# Patient Record
Sex: FEMALE | Race: WHITE | Employment: UNEMPLOYED | URBAN - METROPOLITAN AREA
[De-identification: names, ages, dates, MRNs, and addresses within clinical notes are randomized per-mention and may not be internally consistent; named-entity substitution may affect disease eponyms.]

---
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
## 2020-10-05 ENCOUNTER — HOSPITAL ENCOUNTER (EMERGENCY)
Age: 20
Discharge: HOME | End: 2020-10-06
Attending: EMERGENCY MEDICINE
Payer: MEDICAID

## 2020-10-05 VITALS
DIASTOLIC BLOOD PRESSURE: 97 MMHG | BODY MASS INDEX: 57.52 KG/M2 | HEIGHT: 60 IN | HEART RATE: 108 BPM | TEMPERATURE: 98.9 F | WEIGHT: 293 LBS | RESPIRATION RATE: 18 BRPM | OXYGEN SATURATION: 100 % | SYSTOLIC BLOOD PRESSURE: 149 MMHG

## 2020-10-05 DIAGNOSIS — N92.6 IRREGULAR MENSTRUAL CYCLE: Primary | ICD-10-CM

## 2020-10-05 DIAGNOSIS — R35.0 URINARY FREQUENCY: ICD-10-CM

## 2020-10-05 DIAGNOSIS — N30.00 ACUTE CYSTITIS WITHOUT HEMATURIA: ICD-10-CM

## 2020-10-05 LAB
APPEARANCE UR: ABNORMAL
BACTERIA URNS QL MICRO: ABNORMAL /HPF
BILIRUB UR QL: NEGATIVE
COLOR UR: ABNORMAL
EPITH CASTS URNS QL MICRO: ABNORMAL /LPF
GLUCOSE UR STRIP.AUTO-MCNC: NEGATIVE MG/DL
HCG UR QL: NEGATIVE
HGB UR QL STRIP: NEGATIVE
HYALINE CASTS URNS QL MICRO: ABNORMAL /LPF (ref 0–5)
KETONES UR QL STRIP.AUTO: NEGATIVE MG/DL
LEUKOCYTE ESTERASE UR QL STRIP.AUTO: ABNORMAL
NITRITE UR QL STRIP.AUTO: NEGATIVE
PH UR STRIP: 6 [PH] (ref 5–8)
PROT UR STRIP-MCNC: NEGATIVE MG/DL
RBC #/AREA URNS HPF: ABNORMAL /HPF (ref 0–5)
SP GR UR REFRACTOMETRY: 1.02 (ref 1–1.03)
UA: UC IF INDICATED,UAUC: ABNORMAL
UROBILINOGEN UR QL STRIP.AUTO: 1 EU/DL (ref 0.2–1)
WBC URNS QL MICRO: ABNORMAL /HPF (ref 0–4)

## 2020-10-05 PROCEDURE — 81001 URINALYSIS AUTO W/SCOPE: CPT

## 2020-10-05 PROCEDURE — 81025 URINE PREGNANCY TEST: CPT

## 2020-10-05 PROCEDURE — 99283 EMERGENCY DEPT VISIT LOW MDM: CPT

## 2020-10-05 RX ORDER — CEPHALEXIN 500 MG/1
500 CAPSULE ORAL 2 TIMES DAILY
Qty: 14 CAP | Refills: 0 | Status: SHIPPED | OUTPATIENT
Start: 2020-10-05 | End: 2020-10-12

## 2020-10-05 RX ORDER — CEPHALEXIN 250 MG/1
500 CAPSULE ORAL
Status: COMPLETED | OUTPATIENT
Start: 2020-10-05 | End: 2020-10-06

## 2020-10-06 PROCEDURE — 74011250637 HC RX REV CODE- 250/637: Performed by: EMERGENCY MEDICINE

## 2020-10-06 RX ADMIN — CEPHALEXIN 500 MG: 250 CAPSULE ORAL at 00:09

## 2020-10-06 NOTE — ED PROVIDER NOTES
EMERGENCY DEPARTMENT HISTORY AND PHYSICAL EXAM      Date: 10/5/2020  Patient Name: Contreras Senior    History of Presenting Illness     Chief Complaint   Patient presents with    Missed Menses     Pt reports her menstrual cycle is 10 days late, reprots positive pregnancy test at home, followed by a negative pregnancy test. Patient reports she missed her birth control pill last Monday. Reports lower abdominal pain/ lower back pain at times. History Provided By: Patient    HPI: Contreras Senior, 21 y.o. female  With previous medical history of heavy menstrual cycles presenting today with a missed period. The patient states that she has been taking birth control pills because of prolonged menstrual cycles that will sometimes last for around 3 weeks. She notes that she has been taking her birth control pills as prescribed and has not had a cycle this month and is approximately 10 days late. She states that she initially took a pregnancy test that was negative, and then she took another UPT that was negative. She went to urgent care 2 days ago and had a blood test that showed no evidence of pregnancy. The patient notes that she still has not had a menstrual cycle has not had any abdominal cramping. She does state that she is been having increased urinary frequency, dysuria, and low back pain. No fevers or chills. No nausea or vomiting. No other complaints at this time. There are no other complaints, changes, or physical findings at this time. PCP: Andrea Connelly NP    No current facility-administered medications on file prior to encounter. Current Outpatient Medications on File Prior to Encounter   Medication Sig Dispense Refill    zaleplon (SONATA) 10 mg capsule Take 1 Cap by mouth nightly as needed for Sleep. Max Daily Amount: 10 mg. Indications: Difficulty Falling Asleep 30 Cap 5    buPROPion HCl 450 mg Tb24 Take 450 mg by mouth every morning.  30 Tab 5    ondansetron hcl (ZOFRAN) 4 mg tablet Take 4 mg by mouth every eight (8) hours as needed for Nausea.  albuterol (PROVENTIL HFA, VENTOLIN HFA, PROAIR HFA) 90 mcg/actuation inhaler Take 2 Puffs by inhalation every six (6) hours as needed for Wheezing. 1 Inhaler 0    drospirenone-ethinyl estradiol (GENIE, 28,) 3-0.02 mg tab Take 1 Tab by mouth daily. Past History     Past Medical History:  Past Medical History:   Diagnosis Date    Anxiety     Asthma     HAS OUTGROWN IT    Depression     GERD (gastroesophageal reflux disease)     Psychiatric disorder     depression and anxiety    Symptomatic cholelithiasis 11/27/2018       Past Surgical History:  Past Surgical History:   Procedure Laterality Date    HX LAP CHOLECYSTECTOMY  12/21/2028    Laparoscopic cholecystectomy by Dr. Daisha Gilliland.  HX TONSILLECTOMY      HX TYMPANOSTOMY         Family History:  Family History   Problem Relation Age of Onset    Crohn's Disease Mother     Lupus Mother     Heart Disease Father     Hypertension Father     Bipolar Disorder Sister     Other Sister         ADHD       Social History:  Social History     Tobacco Use    Smoking status: Never Smoker    Smokeless tobacco: Never Used   Substance Use Topics    Alcohol use: No    Drug use: Never       Allergies: Allergies   Allergen Reactions    Poison Ivy Relief [Benzocaine] Anaphylaxis    Poison Oak Extract Hives         Review of Systems   Constitutional: No  fever  Skin: No  rash  HEENT: No  nasal congestion  Resp: No cough  CV: No chest pain  GI: No vomiting  : + dysuria  MSK: No joint pain  Neuro: No numbness  Psych: No suicidal      Physical Exam     Patient Vitals for the past 12 hrs:   Temp Pulse Resp BP SpO2   10/05/20 2310 98.9 °F (37.2 °C) (!) 108 18 (!) 149/97 100 %       General: alert, No acute distress  Eyes: EOMI, normal conjunctiva  ENT: moist mucous membranes. Neck: Active, full ROM of neck. Skin: No rashes. no jaundice              Lungs: Equal chest expansion. no respiratory distress. Heart: regular rate     no peripheral edema    Abd:  non distended soft, no abdominal pain to palpation. Back: Full ROM  MSK: Full, active ROM in all 4 extremities. Neuro: alert  Person, Place, Time and Situation; normal speech;   Psych: Cooperative with exam; Appropriate mood and affect             Diagnostic Study Results     Labs -     Recent Results (from the past 12 hour(s))   URINALYSIS W/ REFLEX CULTURE    Collection Time: 10/05/20 11:20 PM    Specimen: Urine   Result Value Ref Range    Color YELLOW/STRAW      Appearance CLOUDY (A) CLEAR      Specific gravity 1.025 1.003 - 1.030      pH (UA) 6.0 5.0 - 8.0      Protein Negative NEG mg/dL    Glucose Negative NEG mg/dL    Ketone Negative NEG mg/dL    Bilirubin Negative NEG      Blood Negative NEG      Urobilinogen 1.0 0.2 - 1.0 EU/dL    Nitrites Negative NEG      Leukocyte Esterase SMALL (A) NEG      WBC 5-10 0 - 4 /hpf    RBC 0-5 0 - 5 /hpf    Epithelial cells MODERATE (A) FEW /lpf    Bacteria 2+ (A) NEG /hpf    UA:UC IF INDICATED CULTURE NOT INDICATED BY UA RESULT CNI      Hyaline cast 2-5 0 - 5 /lpf   HCG URINE, QL. - POC    Collection Time: 10/05/20 11:28 PM   Result Value Ref Range    Pregnancy test,urine (POC) Negative NEG         Radiologic Studies -   No orders to display     CT Results  (Last 48 hours)    None        CXR Results  (Last 48 hours)    None          Medical Decision Making   I am the first provider for this patient. I reviewed the vital signs, available nursing notes, past medical history, past surgical history, family history and social history. Vital Signs-Reviewed the patient's vital signs.   Patient Vitals for the past 12 hrs:   Temp Pulse Resp BP SpO2   10/05/20 2310 98.9 °F (37.2 °C) (!) 108 18 (!) 149/97 100 %       Pulse Oximetry Analysis - 100% on room air    Provider Notes (Medical Decision Making):     Differential Diagnosis: Pregnancy, urinary tract infection, irregular menstrual cycle, menorrhagia    Initial Plan: Will obtain urinalysis sample and UPT. The patient has a benign abdominal exam.  She was mildly tachycardic upon arrival, but no significant findings on her exam.    ED Course:   Initial assessment performed. The patients presenting problems have been discussed, and they are in agreement with the care plan formulated and outlined with them. I have encouraged them to ask questions as they arise throughout their visit. ED Course as of Oct 06 0157   Mon Oct 05, 2020   2338 On my interpretation of the patient's laboratory studies urinalysis shows 2+ bacteria, leukocyte esterase and white blood cells, negative pregnancy test.    [NW]   2341 Patient presenting today with irregular menstrual cycle in the setting of taking birth control. She had a quantitative blood hCG that was negative done at urgent care a couple of days ago, and has had multiple urine pregnancy test that are negative including today. We discussed that she could just be having irregular cycle even despite being on the birth control pills. I will treat her urinary tract infection as she is having some symptoms of dysuria and urinary frequency. Ultimately patient is discharged to follow-up as an outpatient with her primary OB/GYN.    [NW]      ED Course User Index  [NW] Meek Brock MD       I, Christy Hutton MD, am the attending of record for this patient encounter. Dispo: Discharged. The patient has been re-evaluated and is ready for discharge. Reviewed available results with patient. Counseled patient on diagnosis and care plan. Patient has expressed understanding, and all questions have been answered. Patient agrees with plan and agrees to follow up as recommended, or to return to the ED if their symptoms worsen. Discharge instructions have been provided and explained to the patient, along with reasons to return to the ED. PLAN:  Discharge Medication List as of 10/6/2020 12:12 AM        2.      Follow-up Information     Follow up With Specialties Details Why [facility]  Schedule an appointment as soon as possible for a visit   [address] [address]        3. Return to ED if worse       Diagnosis     Clinical Impression:   1. Irregular menstrual cycle    2. Urinary frequency    3. Acute cystitis without hematuria        Attestations:    Aquiles Davis MD    Please note that this dictation was completed with [employer], the computer voice recognition software. Quite often unanticipated grammatical, syntax, homophones, and other interpretive errors are inadvertently transcribed by the computer software. Please disregard these errors. Please excuse any errors that have escaped final proofreading. Thank you.

## 2022-01-11 ENCOUNTER — HOSPITAL ENCOUNTER (EMERGENCY)
Age: 22
Discharge: HOME | End: 2022-01-11
Attending: EMERGENCY MEDICINE
Payer: MEDICAID

## 2022-01-11 VITALS
RESPIRATION RATE: 18 BRPM | HEART RATE: 113 BPM | BODY MASS INDEX: 53.92 KG/M2 | TEMPERATURE: 98.6 F | OXYGEN SATURATION: 99 % | HEIGHT: 62 IN | WEIGHT: 293 LBS

## 2022-01-11 DIAGNOSIS — R51.9 NONINTRACTABLE HEADACHE, UNSPECIFIED CHRONICITY PATTERN, UNSPECIFIED HEADACHE TYPE: ICD-10-CM

## 2022-01-11 DIAGNOSIS — Z20.822 SUSPECTED COVID-19 VIRUS INFECTION: Primary | ICD-10-CM

## 2022-01-11 LAB
SARS-COV-2, COV2: NORMAL
SARS-COV-2, XPLCVT: DETECTED
SOURCE, COVRS: ABNORMAL

## 2022-01-11 PROCEDURE — 99282 EMERGENCY DEPT VISIT SF MDM: CPT

## 2022-01-11 PROCEDURE — 74011250637 HC RX REV CODE- 250/637: Performed by: EMERGENCY MEDICINE

## 2022-01-11 PROCEDURE — U0005 INFEC AGEN DETEC AMPLI PROBE: HCPCS

## 2022-01-11 RX ORDER — IBUPROFEN 600 MG/1
600 TABLET ORAL
Status: COMPLETED | OUTPATIENT
Start: 2022-01-11 | End: 2022-01-11

## 2022-01-11 RX ADMIN — IBUPROFEN 600 MG: 600 TABLET ORAL at 04:14

## 2022-01-11 NOTE — Clinical Note
[facility] EMERGENCY DEPT  [address]  [address] [place] [id number]  [phone]    Work/School Note    Date: 1/11/2022     To Whom It May concern: Janet Jefferson was evaluated by the following provider(s):  Attending Provider: Ankita Day MD.   COVID19 virus is suspected. Per the CDC guidelines we recommend home isolation until the following conditions are all met:    1. At least five days have passed since symptoms first appeared and/or had a close exposure,   2. After home isolation for five days, wearing a mask around others for the next five days,  3. At least 24 have passed since last fever without the use of fever-reducing medications and  4.  Symptoms (eg cough, shortness of breath) have improved      Sincerely,          Felecia Porter MD

## 2022-01-11 NOTE — ED PROVIDER NOTES
EMERGENCY DEPARTMENT HISTORY AND PHYSICAL EXAM      Date: 1/11/2022  Patient Name: Carmel Whittaker    History of Presenting Illness     Chief Complaint   Patient presents with    Headache     onset with headache, concerned about COVID       History Provided By: Patient    HPI: Carmel Whittaker, 24 y.o. female with no significant past medical history presents to the ED with chief complaint of headache for the past 2 days. Patient had a temperature of 101.1 before coming to the ED. She has taken Advil, Tylenol, Midol with mild relief. She has not had any COVID vaccines. She denies any chest pain or shortness of breath. Denies any nausea or vomiting. 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 States that she presents to the ED to be evaluated for possible COVID because  \"I just want to be safe. \"  Denies chest pain, cough, shortness of breath, loss of taste or smell. PCP: Jane Marin NP    No current facility-administered medications on file prior to encounter. Current Outpatient Medications on File Prior to Encounter   Medication Sig Dispense Refill    zaleplon (SONATA) 10 mg capsule Take 1 Cap by mouth nightly as needed for Sleep. Max Daily Amount: 10 mg. Indications: Difficulty Falling Asleep 30 Cap 5    buPROPion HCl 450 mg Tb24 Take 450 mg by mouth every morning. 30 Tab 5    ondansetron hcl (ZOFRAN) 4 mg tablet Take 4 mg by mouth every eight (8) hours as needed for Nausea.  albuterol (PROVENTIL HFA, VENTOLIN HFA, PROAIR HFA) 90 mcg/actuation inhaler Take 2 Puffs by inhalation every six (6) hours as needed for Wheezing. 1 Inhaler 0    drospirenone-ethinyl estradiol (GENIE, 28,) 3-0.02 mg tab Take 1 Tab by mouth daily.          Past History     Past Medical History:  Past Medical History:   Diagnosis Date    Anxiety     Asthma     HAS OUTGROWN IT    Depression     GERD (gastroesophageal reflux disease)     Psychiatric disorder     depression and anxiety    Symptomatic cholelithiasis 11/27/2018       Past Surgical History:  Past Surgical History:   Procedure Laterality Date    HX LAP CHOLECYSTECTOMY  12/21/2028    Laparoscopic cholecystectomy by Dr. Horace Sweet.  HX TONSILLECTOMY      HX TYMPANOSTOMY         Family History:  Family History   Problem Relation Age of Onset    Crohn's Disease Mother     Lupus Mother     Heart Disease Father     Hypertension Father     Bipolar Disorder Sister     Other Sister         ADHD       Social History:  Social History     Tobacco Use    Smoking status: Never Smoker    Smokeless tobacco: Never Used   Substance Use Topics    Alcohol use: No    Drug use: Never       Allergies: Allergies   Allergen Reactions    Poison Ivy Relief [Benzocaine] Anaphylaxis    Poison Oak Extract Hives         Review of Systems   Review of Systems   Constitutional: Positive for fever. Negative for chills. HENT: Negative for congestion, ear pain, rhinorrhea and sore throat. Eyes: Negative for visual disturbance. Respiratory: Negative for cough, chest tightness and wheezing. Cardiovascular: Negative for chest pain and palpitations. Gastrointestinal: Negative for abdominal pain, diarrhea, nausea and vomiting. Genitourinary: Negative for dysuria, flank pain and hematuria. Musculoskeletal: Negative for back pain, myalgias and neck pain. Neurological: Positive for headaches. Negative for syncope and light-headedness. Psychiatric/Behavioral: The patient is not nervous/anxious.           Physical Exam    General appearance -overweight, well appearing, and in no distress  Eyes - pupils equal and reactive, extraocular eye movements intact  ENT - mucous membranes moist, pharynx normal without lesions  Neck - supple, no significant adenopathy; non-tender to palpation  Chest - clear to auscultation, no wheezes, rales or rhonchi; non-tender to palpation  Heart - normal rate and regular rhythm, S1 and S2 normal, no murmurs noted  Abdomen - soft, nontender, nondistended, no masses or organomegaly  Musculoskeletal - no joint tenderness, deformity or swelling; normal ROM  Extremities - peripheral pulses normal, no pedal edema  Skin - normal coloration and turgor, no rashes  Neurological - alert, oriented x3, normal speech, no focal findings or movement disorder noted    Diagnostic Study Results     Labs -   No results found for this or any previous visit (from the past 12 hour(s)). Radiologic Studies -   No orders to display     CT Results  (Last 48 hours)    None        CXR Results  (Last 48 hours)    None            Medical Decision Making   I am the first provider for this patient. I reviewed the vital signs, available nursing notes, past medical history, past surgical history, family history and social history. Vital Signs-Reviewed the patient's vital signs. Records Reviewed: Nursing Notes and Old Medical Records    Provider Notes (Medical Decision Making):   Differential diagnosis: Viral syndrome, COVID, tension, cluster, migraine headache    ED Course:   Initial assessment performed. The patients presenting problems have been discussed, and they are in agreement with the care plan formulated and outlined with them. I have encouraged them to ask questions as they arise throughout their visit. Disposition:  Discharge home    PLAN:  1. Discharge Medication List as of 1/11/2022  3:56 AM        2. Follow-up Information     Follow up With Specialties Details Why Contact Info    Mark Kaur NP Nurse Practitioner Schedule an appointment as soon as possible for a visit   [address] [facility]  [phone]      [facility] EMERGENCY DEPT Emergency Medicine  If symptoms worsen [address] [place] [address] Drive  [address] [facility]  [phone]        Return to ED if worse     Diagnosis     Clinical Impression:   1. Suspected COVID-19 virus infection    2.  Nonintractable headache, unspecified chronicity pattern, unspecified headache type

## 2022-01-12 NOTE — PROGRESS NOTES
RE COVID: Patient has seen the positive result in My Chart. Called patient and left \[facility]\"" complaint  for return call to discuss results. Letter not sent since patient has already seen the results.

## 2022-03-19 PROBLEM — E66.01 OBESITY, MORBID (HCC): Status: ACTIVE | Noted: 2018-06-29

## 2022-03-19 PROBLEM — K80.20 SYMPTOMATIC CHOLELITHIASIS: Status: ACTIVE | Noted: 2018-11-27

## 2024-01-03 ENCOUNTER — HOSPITAL ENCOUNTER (OUTPATIENT)
Facility: HOSPITAL | Age: 24
Discharge: HOME | End: 2024-01-06
Payer: MEDICAID

## 2024-01-03 DIAGNOSIS — M51.36 DDD (DEGENERATIVE DISC DISEASE), LUMBAR: ICD-10-CM

## 2024-01-03 DIAGNOSIS — M54.16 LEFT LUMBAR RADICULITIS: ICD-10-CM

## 2024-01-03 DIAGNOSIS — M54.50 LUMBAR SPINE PAIN: ICD-10-CM

## 2024-01-03 PROCEDURE — 72148 MRI LUMBAR SPINE W/O DYE: CPT

## 2024-08-28 NOTE — DIABETES MGMT
[facility] - Weight Management Note       DATE: 19    REFERRING PHYSICIAN: Tory Murray   NAME: Oscar Galeas : 2000 AGE: 23 y.o. GENDER: female  Initial Individual Weight Management Visit    Pt seen individually today for help with weight loss. Pt with hx of newly dx sleep apnea (working on communication with insurance to obtain CPAP), depression, and anxiety. Pt sees psychiatrist, was seeing talk therapist however discontinue this approximately 2 years ago as \" I was stable to be released\"  . Pt was hospitalized ~ 5 years ago (pt shared that this was related to suicidal ideation). Pt's shared that her main contributors to weight gain are emotional/stress eating. Pt shared that \"I have always been bigger\", reports weight of 250-280 lbs in last 4 years with current weight being the heaviest she has been. Pt shared that she is not focused on a specific number/ goal weight but that she wants to \"be healthy\" and be able to go to the MD office and not be told she needs to lose weight/is obese. Pt does some walking , reports walking ~ 1 mile with [employer] every other day. Pt did share she gets very self conscious when she walks outside, feels like people are staring at her. Pt notes hx of stomach cramps from gallbladder removal and consumption of spicy, greasy foods as well as during her cycle. Reports pain of 6/7 of 10 scale. Pt has poor sleep habits, often finding it difficult to fall asleep. Diet Recall:   Diner: ham sandwich with 1 tsp bishop, ~ 24 oz apple juice  Lunch: may often skip or eat late breakfast  Breakfast :1.5 c neha charms with whole milk    Pt reports drinking at least ~ 1-2 sugary beverages daily, shared that she was drinking 3-4 sodas daily but has decreased this. Often snacks Peanut butter sandwiches, poptarts, 2/3 cup pringles, popcorn , 1 cup cheeze sarita throughout the day.     ASSESSMENT:  Past Medical Hx:   Past Medical History: Diagnosis Date    Anxiety     Asthma     HAS OUTGROWN IT    Depression     GERD (gastroesophageal reflux disease)     Psychiatric disorder     depression and anxiety    Symptomatic cholelithiasis 11/27/2018       LABS:   No results for input(s): LDL in the last 72 hours. No lab exists for component:  BMP    MEDICATIONS/SUPPLEMENTS:   Current Outpatient Medications   Medication Sig    zaleplon (SONATA) 10 mg capsule Take 1 Cap by mouth nightly as needed for Sleep. Max Daily Amount: 10 mg. Indications: Difficulty Falling Asleep    buPROPion HCl 450 mg Tb24 Take 450 mg by mouth every morning.  ondansetron hcl (ZOFRAN) 4 mg tablet Take 4 mg by mouth every eight (8) hours as needed for Nausea.  albuterol (PROVENTIL HFA, VENTOLIN HFA, PROAIR HFA) 90 mcg/actuation inhaler Take 2 Puffs by inhalation every six (6) hours as needed for Wheezing.  drospirenone-ethinyl estradiol (GENIE, 28,) 3-0.02 mg tab Take 1 Tab by mouth daily. No current facility-administered medications for this encounter. FOOD ALLERGIES/INTOLERANCES: NKFA, dislikes fish     ANTHROPOMETRICS:    Ht: 60in   Current BW:281.8   IBW: 100 lbs - 10%: Adj BW: 145  BMI: 55.03 kg/m2           Estimated Nutritional Needs:  2373 kcal/day for weight maintenance , 1873 kcal/ for weight loss, ~79-90 g protein (using~ 1.2 g/kg adj BW)    Described eating habits: binge eating, emotional eater and frequent snacker      Environmental/Social:  Whom does patient live with? Mother and nanny   Who does shopping/cooking? Both mother and daughter  Where does patient purchase food? Mother   Where does patient eat most of their meals?family   Does patient report alcohol or recreational drug use? No     NUTRITION DIAGNOSIS:  Overweight/Obesity consistent with emotional eating supported by diet recall and report of past eating behaviors. INTERVENTION/GOAL SETTING:  Discussion was had regarding science of obesity. Pt was given time to ask questions. Time was spent identifying barriers to weight loss. Patient reported barriers include: \"other people in my family have trouble managing their weight\", \"I am taking medications that have a side effect of weight gain\", \"it is hard for me to be physically active because of my weight\"  Addressed barriers and developed individualized plan for overcoming those deemed modifiable. Discussed realistic behavioral modifications and developed strategies for these. Educator strongly encouraged pt to seek out talk therapy given pt reports of continued stressors and difficulty with stress management. Discussed that what patient has shared today does need to be addressed given pt's report of emotional eating. Discussed limitations in being able to help patient related to behavioral health but can reinforce/ help with re-framing focus on food habits. Discussed importance in behavior change and making small goals. Discussed setting small goals that will help pt to build self efficacy to drive continued success with changes. Discussed benefits of food documentation as well as utilizing coping skills( such as reading, music, deep breathing) when feeling stressed . - again, strongly encouraged pt to revisit talk therapist.     Pt chose the following behaviors to begin modifying  prior to class #1:   - Include produce (fruit or vegetables at each meal 3x/weekly  - Limit how often I eat fried foods to 4x/weekly  - Keep ongoing food records  - set limits with family regarding ways to lose weight (as this causes pt stress which exacerbates desire to emotional eat)   - complete \"coping with triggers \" and bring to f/u appointment  - Ashly Saenz 2 things to take ownership of ( pt has difficulty in avoiding rumination about decisions which leads to stress and increases risk for emotional eating)          Pt has f/u appointment in ~ 1 month. To bring food records, completed trigger assignment, and share 2 things she takes ownership of. No

## 2024-09-15 ENCOUNTER — HOSPITAL ENCOUNTER (EMERGENCY)
Facility: HOSPITAL | Age: 24
Discharge: HOME | End: 2024-09-15
Payer: MEDICAID

## 2024-09-15 ENCOUNTER — APPOINTMENT (OUTPATIENT)
Facility: HOSPITAL | Age: 24
End: 2024-09-15
Payer: MEDICAID

## 2024-09-15 VITALS
HEART RATE: 92 BPM | RESPIRATION RATE: 18 BRPM | OXYGEN SATURATION: 98 % | TEMPERATURE: 98.6 F | DIASTOLIC BLOOD PRESSURE: 85 MMHG | SYSTOLIC BLOOD PRESSURE: 145 MMHG

## 2024-09-15 DIAGNOSIS — T30.0 FLASH BURN: Primary | ICD-10-CM

## 2024-09-15 PROCEDURE — 2500000003 HC RX 250 WO HCPCS

## 2024-09-15 PROCEDURE — 71046 X-RAY EXAM CHEST 2 VIEWS: CPT

## 2024-09-15 PROCEDURE — 99283 EMERGENCY DEPT VISIT LOW MDM: CPT

## 2024-09-15 RX ORDER — LIDOCAINE HYDROCHLORIDE 20 MG/ML
5 INJECTION, SOLUTION EPIDURAL; INFILTRATION; INTRACAUDAL; PERINEURAL ONCE
Status: COMPLETED | OUTPATIENT
Start: 2024-09-15 | End: 2024-09-15

## 2024-09-15 RX ADMIN — LIDOCAINE HYDROCHLORIDE 5 ML: 20 INJECTION, SOLUTION EPIDURAL; INFILTRATION; INTRACAUDAL; PERINEURAL at 20:31

## (undated) DEVICE — SOLUTION IRRIGATION NACL 0.9% 1000 ML FLX CONTAINER

## (undated) DEVICE — APPLICATOR BNDG 1MM ADH PREMIERPRO EXOFIN

## (undated) DEVICE — BLADELESS OPTICAL TROCAR WITH FIXATION CANNULA: Brand: VERSAONE

## (undated) DEVICE — TROCAR SITE CLOSURE DEVICE: Brand: ENDO CLOSE

## (undated) DEVICE — NEEDLE HYPO 25GA L1.5IN BVL ORIENTED ECLIPSE

## (undated) DEVICE — (D)PREP SKN CHLRAPRP APPL 26ML -- CONVERT TO ITEM 371833

## (undated) DEVICE — TUBING INSUFLTN 10FT LUER -- CONVERT TO ITEM 368568

## (undated) DEVICE — DISSECTOR RMFG CURVED 5MM --

## (undated) DEVICE — UNIVERSAL FIXATION CANNULA: Brand: VERSAONE

## (undated) DEVICE — INFECTION CONTROL KIT SYS

## (undated) DEVICE — FILTER SMK EVAC FLO CLR MEGADYNE

## (undated) DEVICE — SUTURE MCRYL SZ 4-0 L27IN ABSRB UD L19MM PS-2 1/2 CIR PRIM Y426H

## (undated) DEVICE — 3000CC GUARDIAN II: Brand: GUARDIAN

## (undated) DEVICE — CLICKLINE SCISSORS INSERT: Brand: CLICKLINE

## (undated) DEVICE — SPECIMEN RETRIEVAL POUCH: Brand: ENDO CATCH GOLD

## (undated) DEVICE — INSUFFLATION NEEDLE: Brand: SURGINEEDLE

## (undated) DEVICE — BLADELESS OPTICAL TROCAR WITH FIXATION CANNULA: Brand: VERSAPORT

## (undated) DEVICE — DEVON™ KNEE AND BODY STRAP 60" X 3" (1.5 M X 7.6 CM): Brand: DEVON

## (undated) DEVICE — APPLIER LIG CLP L13IN 10MM PSTL GRP CONTAIN 15 TI L CLP

## (undated) DEVICE — KENDALL SCD EXPRESS SLEEVES, KNEE LENGTH, MEDIUM: Brand: KENDALL SCD

## (undated) DEVICE — SUTURE SZ 0 27IN 5/8 CIR UR-6  TAPER PT VIOLET ABSRB VICRYL J603H

## (undated) DEVICE — STERILE POLYISOPRENE POWDER-FREE SURGICAL GLOVES WITH EMOLLIENT COATING: Brand: PROTEXIS

## (undated) DEVICE — APPLIER LIG CLP 5MM CONTAIN 16 TI CLP DISP ENDO CLP

## (undated) DEVICE — REM POLYHESIVE ADULT PATIENT RETURN ELECTRODE: Brand: VALLEYLAB

## (undated) DEVICE — DRAPE,UTILTY,TAPE,15X26, 4EA/PK: Brand: MEDLINE

## (undated) DEVICE — SURGICAL PROCEDURE KIT GEN LAPAROSCOPY LF

## (undated) DEVICE — Device